# Patient Record
Sex: FEMALE | Race: ASIAN | NOT HISPANIC OR LATINO
[De-identification: names, ages, dates, MRNs, and addresses within clinical notes are randomized per-mention and may not be internally consistent; named-entity substitution may affect disease eponyms.]

---

## 2018-05-24 ENCOUNTER — APPOINTMENT (OUTPATIENT)
Dept: CARDIOLOGY | Facility: CLINIC | Age: 48
End: 2018-05-24
Payer: COMMERCIAL

## 2018-05-24 ENCOUNTER — NON-APPOINTMENT (OUTPATIENT)
Age: 48
End: 2018-05-24

## 2018-05-24 VITALS
WEIGHT: 145 LBS | DIASTOLIC BLOOD PRESSURE: 59 MMHG | RESPIRATION RATE: 16 BRPM | HEIGHT: 60 IN | HEART RATE: 69 BPM | BODY MASS INDEX: 28.47 KG/M2 | SYSTOLIC BLOOD PRESSURE: 95 MMHG | OXYGEN SATURATION: 100 %

## 2018-05-24 DIAGNOSIS — R07.89 OTHER CHEST PAIN: ICD-10-CM

## 2018-05-24 DIAGNOSIS — R94.31 ABNORMAL ELECTROCARDIOGRAM [ECG] [EKG]: ICD-10-CM

## 2018-05-24 PROCEDURE — 93000 ELECTROCARDIOGRAM COMPLETE: CPT

## 2018-05-24 PROCEDURE — 99204 OFFICE O/P NEW MOD 45 MIN: CPT

## 2018-06-26 ENCOUNTER — OUTPATIENT (OUTPATIENT)
Dept: OUTPATIENT SERVICES | Facility: HOSPITAL | Age: 48
LOS: 1 days | End: 2018-06-26

## 2018-06-26 ENCOUNTER — APPOINTMENT (OUTPATIENT)
Dept: CV DIAGNOSITCS | Facility: HOSPITAL | Age: 48
End: 2018-06-26
Payer: COMMERCIAL

## 2018-06-26 DIAGNOSIS — R94.31 ABNORMAL ELECTROCARDIOGRAM [ECG] [EKG]: ICD-10-CM

## 2018-06-26 PROCEDURE — 93306 TTE W/DOPPLER COMPLETE: CPT | Mod: 26

## 2018-07-18 ENCOUNTER — OUTPATIENT (OUTPATIENT)
Dept: OUTPATIENT SERVICES | Facility: HOSPITAL | Age: 48
LOS: 1 days | End: 2018-07-18
Payer: COMMERCIAL

## 2018-07-18 ENCOUNTER — APPOINTMENT (OUTPATIENT)
Dept: MAMMOGRAPHY | Facility: IMAGING CENTER | Age: 48
End: 2018-07-18
Payer: COMMERCIAL

## 2018-07-18 DIAGNOSIS — Z00.8 ENCOUNTER FOR OTHER GENERAL EXAMINATION: ICD-10-CM

## 2018-07-18 PROCEDURE — 77067 SCR MAMMO BI INCL CAD: CPT

## 2018-07-18 PROCEDURE — 77063 BREAST TOMOSYNTHESIS BI: CPT | Mod: 26

## 2018-07-18 PROCEDURE — 77063 BREAST TOMOSYNTHESIS BI: CPT

## 2018-07-18 PROCEDURE — 77067 SCR MAMMO BI INCL CAD: CPT | Mod: 26

## 2021-08-22 ENCOUNTER — INPATIENT (INPATIENT)
Facility: HOSPITAL | Age: 51
LOS: 2 days | Discharge: HOME | DRG: 446 | End: 2021-08-25
Attending: INTERNAL MEDICINE | Admitting: INTERNAL MEDICINE
Payer: COMMERCIAL

## 2021-08-22 VITALS
OXYGEN SATURATION: 97 % | SYSTOLIC BLOOD PRESSURE: 103 MMHG | WEIGHT: 151.9 LBS | TEMPERATURE: 98 F | RESPIRATION RATE: 18 BRPM | HEART RATE: 71 BPM | DIASTOLIC BLOOD PRESSURE: 73 MMHG

## 2021-08-22 DIAGNOSIS — Z29.9 ENCOUNTER FOR PROPHYLACTIC MEASURES, UNSPECIFIED: ICD-10-CM

## 2021-08-22 DIAGNOSIS — R10.9 UNSPECIFIED ABDOMINAL PAIN: ICD-10-CM

## 2021-08-22 DIAGNOSIS — R79.89 OTHER SPECIFIED ABNORMAL FINDINGS OF BLOOD CHEMISTRY: ICD-10-CM

## 2021-08-22 DIAGNOSIS — Z78.9 OTHER SPECIFIED HEALTH STATUS: Chronic | ICD-10-CM

## 2021-08-22 LAB
ALBUMIN SERPL ELPH-MCNC: 3.7 G/DL — SIGNIFICANT CHANGE UP (ref 3.3–5)
ALP SERPL-CCNC: 238 U/L — HIGH (ref 40–120)
ALT FLD-CCNC: 314 U/L — HIGH (ref 10–45)
ANION GAP SERPL CALC-SCNC: 12 MMOL/L — SIGNIFICANT CHANGE UP (ref 5–17)
ANISOCYTOSIS BLD QL: SLIGHT — SIGNIFICANT CHANGE UP
APPEARANCE UR: CLEAR — SIGNIFICANT CHANGE UP
APTT BLD: 34.4 SEC — SIGNIFICANT CHANGE UP (ref 27.5–35.5)
AST SERPL-CCNC: 273 U/L — HIGH (ref 10–40)
BACTERIA # UR AUTO: NEGATIVE — SIGNIFICANT CHANGE UP
BASOPHILS # BLD AUTO: 0.31 K/UL — HIGH (ref 0–0.2)
BASOPHILS NFR BLD AUTO: 3.5 % — HIGH (ref 0–2)
BILIRUB SERPL-MCNC: 2.4 MG/DL — HIGH (ref 0.2–1.2)
BILIRUB UR-MCNC: NEGATIVE — SIGNIFICANT CHANGE UP
BUN SERPL-MCNC: 7 MG/DL — SIGNIFICANT CHANGE UP (ref 7–23)
BURR CELLS BLD QL SMEAR: PRESENT — SIGNIFICANT CHANGE UP
CALCIUM SERPL-MCNC: 9.7 MG/DL — SIGNIFICANT CHANGE UP (ref 8.4–10.5)
CHLORIDE SERPL-SCNC: 103 MMOL/L — SIGNIFICANT CHANGE UP (ref 96–108)
CO2 SERPL-SCNC: 22 MMOL/L — SIGNIFICANT CHANGE UP (ref 22–31)
COLOR SPEC: YELLOW — SIGNIFICANT CHANGE UP
CREAT SERPL-MCNC: 0.99 MG/DL — SIGNIFICANT CHANGE UP (ref 0.5–1.3)
DACRYOCYTES BLD QL SMEAR: SLIGHT — SIGNIFICANT CHANGE UP
DIFF PNL FLD: NEGATIVE — SIGNIFICANT CHANGE UP
ELLIPTOCYTES BLD QL SMEAR: SLIGHT — SIGNIFICANT CHANGE UP
EOSINOPHIL # BLD AUTO: 0.15 K/UL — SIGNIFICANT CHANGE UP (ref 0–0.5)
EOSINOPHIL NFR BLD AUTO: 1.7 % — SIGNIFICANT CHANGE UP (ref 0–6)
EPI CELLS # UR: 0 /HPF — SIGNIFICANT CHANGE UP
GAS PNL BLDV: SIGNIFICANT CHANGE UP
GIANT PLATELETS BLD QL SMEAR: PRESENT — SIGNIFICANT CHANGE UP
GLUCOSE SERPL-MCNC: 85 MG/DL — SIGNIFICANT CHANGE UP (ref 70–99)
GLUCOSE UR QL: NEGATIVE — SIGNIFICANT CHANGE UP
HCG UR QL: NEGATIVE — SIGNIFICANT CHANGE UP
HCT VFR BLD CALC: 41.6 % — SIGNIFICANT CHANGE UP (ref 34.5–45)
HGB BLD-MCNC: 12.9 G/DL — SIGNIFICANT CHANGE UP (ref 11.5–15.5)
HYALINE CASTS # UR AUTO: 1 /LPF — SIGNIFICANT CHANGE UP (ref 0–2)
INR BLD: 1.11 RATIO — SIGNIFICANT CHANGE UP (ref 0.88–1.16)
KETONES UR-MCNC: SIGNIFICANT CHANGE UP
LEUKOCYTE ESTERASE UR-ACNC: NEGATIVE — SIGNIFICANT CHANGE UP
LIDOCAIN IGE QN: 44 U/L — SIGNIFICANT CHANGE UP (ref 7–60)
LYMPHOCYTES # BLD AUTO: 1.23 K/UL — SIGNIFICANT CHANGE UP (ref 1–3.3)
LYMPHOCYTES # BLD AUTO: 13.9 % — SIGNIFICANT CHANGE UP (ref 13–44)
MACROCYTES BLD QL: SLIGHT — SIGNIFICANT CHANGE UP
MANUAL SMEAR VERIFICATION: SIGNIFICANT CHANGE UP
MCHC RBC-ENTMCNC: 23.5 PG — LOW (ref 27–34)
MCHC RBC-ENTMCNC: 31 GM/DL — LOW (ref 32–36)
MCV RBC AUTO: 75.8 FL — LOW (ref 80–100)
MICROCYTES BLD QL: SIGNIFICANT CHANGE UP
MONOCYTES # BLD AUTO: 0.23 K/UL — SIGNIFICANT CHANGE UP (ref 0–0.9)
MONOCYTES NFR BLD AUTO: 2.6 % — SIGNIFICANT CHANGE UP (ref 2–14)
NEUTROPHILS # BLD AUTO: 6.93 K/UL — SIGNIFICANT CHANGE UP (ref 1.8–7.4)
NEUTROPHILS NFR BLD AUTO: 78.3 % — HIGH (ref 43–77)
NITRITE UR-MCNC: NEGATIVE — SIGNIFICANT CHANGE UP
NT-PROBNP SERPL-SCNC: 129 PG/ML — SIGNIFICANT CHANGE UP (ref 0–300)
OVALOCYTES BLD QL SMEAR: SLIGHT — SIGNIFICANT CHANGE UP
PH UR: 6 — SIGNIFICANT CHANGE UP (ref 5–8)
PLAT MORPH BLD: ABNORMAL
PLATELET # BLD AUTO: 230 K/UL — SIGNIFICANT CHANGE UP (ref 150–400)
POIKILOCYTOSIS BLD QL AUTO: SIGNIFICANT CHANGE UP
POLYCHROMASIA BLD QL SMEAR: SLIGHT — SIGNIFICANT CHANGE UP
POTASSIUM SERPL-MCNC: 4.7 MMOL/L — SIGNIFICANT CHANGE UP (ref 3.5–5.3)
POTASSIUM SERPL-SCNC: 4.7 MMOL/L — SIGNIFICANT CHANGE UP (ref 3.5–5.3)
PROT SERPL-MCNC: 7 G/DL — SIGNIFICANT CHANGE UP (ref 6–8.3)
PROT UR-MCNC: NEGATIVE — SIGNIFICANT CHANGE UP
PROTHROM AB SERPL-ACNC: 13.3 SEC — SIGNIFICANT CHANGE UP (ref 10.6–13.6)
RBC # BLD: 5.49 M/UL — HIGH (ref 3.8–5.2)
RBC # FLD: 15.5 % — HIGH (ref 10.3–14.5)
RBC BLD AUTO: ABNORMAL
RBC CASTS # UR COMP ASSIST: 1 /HPF — SIGNIFICANT CHANGE UP (ref 0–4)
SCHISTOCYTES BLD QL AUTO: SLIGHT — SIGNIFICANT CHANGE UP
SODIUM SERPL-SCNC: 137 MMOL/L — SIGNIFICANT CHANGE UP (ref 135–145)
SP GR SPEC: 1.01 — LOW (ref 1.01–1.02)
TROPONIN T, HIGH SENSITIVITY RESULT: <6 NG/L — SIGNIFICANT CHANGE UP (ref 0–51)
UROBILINOGEN FLD QL: NEGATIVE — SIGNIFICANT CHANGE UP
WBC # BLD: 8.85 K/UL — SIGNIFICANT CHANGE UP (ref 3.8–10.5)
WBC # FLD AUTO: 8.85 K/UL — SIGNIFICANT CHANGE UP (ref 3.8–10.5)
WBC UR QL: 1 /HPF — SIGNIFICANT CHANGE UP (ref 0–5)

## 2021-08-22 PROCEDURE — 76705 ECHO EXAM OF ABDOMEN: CPT | Mod: 26,RT

## 2021-08-22 PROCEDURE — 74177 CT ABD & PELVIS W/CONTRAST: CPT | Mod: 26,MA

## 2021-08-22 PROCEDURE — 99223 1ST HOSP IP/OBS HIGH 75: CPT

## 2021-08-22 PROCEDURE — 99285 EMERGENCY DEPT VISIT HI MDM: CPT

## 2021-08-22 PROCEDURE — 71046 X-RAY EXAM CHEST 2 VIEWS: CPT | Mod: 26

## 2021-08-22 RX ORDER — SODIUM CHLORIDE 9 MG/ML
1000 INJECTION, SOLUTION INTRAVENOUS
Refills: 0 | Status: DISCONTINUED | OUTPATIENT
Start: 2021-08-22 | End: 2021-08-25

## 2021-08-22 RX ORDER — ONDANSETRON 8 MG/1
4 TABLET, FILM COATED ORAL EVERY 8 HOURS
Refills: 0 | Status: DISCONTINUED | OUTPATIENT
Start: 2021-08-22 | End: 2021-08-25

## 2021-08-22 RX ORDER — SODIUM CHLORIDE 9 MG/ML
1000 INJECTION, SOLUTION INTRAVENOUS ONCE
Refills: 0 | Status: COMPLETED | OUTPATIENT
Start: 2021-08-22 | End: 2021-08-22

## 2021-08-22 RX ORDER — SIMETHICONE 80 MG/1
0 TABLET, CHEWABLE ORAL
Qty: 0 | Refills: 0 | DISCHARGE

## 2021-08-22 RX ORDER — OMEPRAZOLE 10 MG/1
1 CAPSULE, DELAYED RELEASE ORAL
Qty: 0 | Refills: 0 | DISCHARGE

## 2021-08-22 RX ORDER — FAMOTIDINE 10 MG/ML
20 INJECTION INTRAVENOUS ONCE
Refills: 0 | Status: COMPLETED | OUTPATIENT
Start: 2021-08-22 | End: 2021-08-22

## 2021-08-22 RX ADMIN — SODIUM CHLORIDE 2000 MILLILITER(S): 9 INJECTION, SOLUTION INTRAVENOUS at 16:03

## 2021-08-22 RX ADMIN — FAMOTIDINE 20 MILLIGRAM(S): 10 INJECTION INTRAVENOUS at 16:03

## 2021-08-22 RX ADMIN — SODIUM CHLORIDE 125 MILLILITER(S): 9 INJECTION, SOLUTION INTRAVENOUS at 18:55

## 2021-08-22 NOTE — ED PROVIDER NOTE - OBJECTIVE STATEMENT
50 yo F no pmh presents with 4 days of epigastric worsening pain. Pain is burning, worse 2-3 hrs after food intake, lasts for 2 hrs and accompanied with nausea and belching. Endorses having light colored stools, decreased PO intake. 52 yo F no pmh presents with 4 days of epigastric worsening pain. Pain is burning, worse 2-3 hrs after food intake, lasts for 2 hrs and accompanied with nausea and belching. Endorses having light colored stools, decreased PO intake. Denies vomiting, fevers, bloody bowel movements, dysuria, urinary frequency.

## 2021-08-22 NOTE — H&P ADULT - PROBLEM SELECTOR PLAN 1
epigastric abdominal pain w/ radiation to back. LFTs w/ cholestatic pattern and CT w/ calcification measuring 0.3 cm in the region of the ampulla of Vater and associated dilated CBD and minimal intrahepatic biliary ductal dilation. Concern for possible intraductal stone.   - GI/hepatology consult in am  - currently pain controlled; if needed, can use morphine 2 mg IV for severe pain  - zofran prn for nausea

## 2021-08-22 NOTE — H&P ADULT - NSHPLABSRESULTS_GEN_ALL_CORE
Personally reviewed labs, imaging and EKG.             12.9   8.85  )-----------( 230      ( 22 Aug 2021 15:58 )             41.6     137  |  103  |  7   ----------------------------<  85  4.7   |  22  |  0.99    Ca    9.7      22 Aug 2021 15:58    TPro  7.0  /  Alb  3.7  /  TBili  2.4<H>  /  DBili  1.3<H>  /  AST  273<H>  /  ALT  314<H>  /  AlkPhos  238<H>      LIVER FUNCTIONS - ( 22 Aug 2021 15:58 )  Alb: 3.7 g/dL / Pro: 7.0 g/dL / ALK PHOS: 238 U/L / ALT: 314 U/L / AST: 273 U/L / GGT: x           PT/INR - ( 22 Aug 2021 17:20 )   PT: 13.3 sec;   INR: 1.11 ratio    PTT - ( 22 Aug 2021 17:20 )  PTT:34.4 sec    Urinalysis Basic - ( 22 Aug 2021 16:48 )  Color: Yellow / Appearance: Clear / S.007 / pH: x  Gluc: x / Ketone: Trace  / Bili: Negative / Urobili: Negative   Blood: x / Protein: Negative / Nitrite: Negative   Leuk Esterase: Negative / RBC: 1 /hpf / WBC 1 /HPF   Sq Epi: x / Non Sq Epi: 0 /hpf / Bacteria: Negative    EKG w/ normal sinus rhythm, no acute ST changes.  CXR w/o focal opacities.     RUQ US: Liver: Hepatic lobe measures 13.2 cm. Echogenic liver lesion measuring 1.2 x 0.7 x 0.9 cm, likely representing hemangioma.  Bile ducts: Borderline common duct, 7 mm. Mild intrahepatic biliary duct dilatation  Gallbladder: No gallstones    CT Abdomen/Pelvis:   Liver-  A 1 cm hypodense right hepatic nodule is too small to characterize, and possibly corresponds to the echogenic nodule on ultrasound. An additional smaller left hepatic hypodensity is similarly too small to characterize.  BILE DUCTS- Minimal intrahepatic biliary ductal dilation. Common bile duct is dilated up to 8 mm. There is a calcification measuring 0.3 cm in the region of the ampulla of Vater.  GALLBLADDER- Mildly distended gallbladder. No radiopaque gallstones.

## 2021-08-22 NOTE — H&P ADULT - ASSESSMENT
51 yr old female presents w/ abdominal pain. Noted to have elevated LFTs w/ cholestatic pattern. CT w/ dilated CBD and minimal intrahepatic biliary ductal dilation.

## 2021-08-22 NOTE — ED ADULT NURSE REASSESSMENT NOTE - NS ED NURSE REASSESS COMMENT FT1
admitting team at bedside for eval. Pt is resting comfortably in stretcher, VSS, awaiting admission bed.

## 2021-08-22 NOTE — H&P ADULT - NSHPPHYSICALEXAM_GEN_ALL_CORE
PHYSICAL EXAM:   GENERAL: Alert. Not confused. No acute distress. Not thin. Not cachectic. Not obese.  HEAD:  Atraumatic. Normocephalic.  EYES: EOMI. PERRLA. Normal conjunctiva/sclera.  ENT: Neck supple. No JVD. Moist oral mucosa. Not edentulous. No thrush.  LYMPH: Normal supraclavicular/cervical lymph nodes.   CARDIAC: No tachy, Not tina. Regular rhythm. Not irregularly irregular. S1. S2. No murmur. No rub. No distant heart sounds.  LUNG/CHEST: CTAB. BS equal bilaterally. No wheezes. No rales. No rhonchi.  ABDOMEN: Soft. +tender to palpation in epigastric region. No distension. No fluid wave. Normal bowel sounds.  BACK: No midline/vertebral tenderness. No flank tenderness.  VASCULAR: +2 b/l radial pulses. Palpable DP pulses.  EXTREMITIES:  No clubbing. No cyanosis. No edema. Moving all 4.  NEUROLOGY: A&Ox3. Non-focal exam. Cranial nerves intact. Normal speech. Sensation intact.  PSYCH: Normal behavior. Normal affect.  SKIN: No jaundice. No erythema. No rash/lesion.  Vascular Access:     ICU Vital Signs Last 24 Hrs  T(C): 36.9 (22 Aug 2021 20:40), Max: 36.9 (22 Aug 2021 16:30)  T(F): 98.5 (22 Aug 2021 20:40), Max: 98.5 (22 Aug 2021 16:30)  HR: 68 (22 Aug 2021 20:40) (68 - 71)  BP: 102/68 (22 Aug 2021 20:40) (102/68 - 105/70)  BP(mean): --  ABP: --  ABP(mean): --  RR: 18 (22 Aug 2021 20:40) (18 - 18)  SpO2: 98% (22 Aug 2021 20:40) (97% - 99%)      I&O's Summary

## 2021-08-22 NOTE — H&P ADULT - HISTORY OF PRESENT ILLNESS
51 yr old female w/ no significant PMH presents w/ abdominal pain. Patient notes that about one week ago she started experiencing epigastric pain that would start about 1-2 hours after eating. It felt like the "choking" and "tightening" sensation in her epigastric region and radiated to her back. She notes she also has a feeling like something is "stuck" in that region. After about 2 hours typically after eating or drinking something, the pain starts and is associated w/ nausea, burping and acid reflux sensation. At rest she is okay but feels like the pain worsens w/ walking. Also feels short of breath after these episodes. Endorses pale colored stools. No diarrhea/constipation. No unintentional weight loss.   She was seen by her PCP and started omeprazole. She endorses taking two doses w/o significant relief. Today, she had milk and a banana this morning but an hour later had an onset of pain so she decided to come to the ED.

## 2021-08-22 NOTE — ED PROVIDER NOTE - NS ED ROS FT
GENERAL: No fever, chills  EYES: no vision changes, no discharge.   ENT: no difficulty swallowing or speaking   CARDIAC: no chest pain/pressure, SOB, lower extremity swelling  PULMONARY: no cough, SOB  GI: +abdominal pain, +nausea, no v/d  : no dysuria, no hematuria  SKIN: no rashes, no ecchymosis  NEURO: no headache, lightheadedness  MSK: No joint pain, myalgia, weakness.

## 2021-08-22 NOTE — ED PROVIDER NOTE - CLINICAL SUMMARY MEDICAL DECISION MAKING FREE TEXT BOX
50 yo F no pmh c/o 4 day hx of epigastric intermitted abd pain that is worse after PO intake. Pt also complaining of pale colored stools. PE VSS epigastric tenderness. Ddx cholecystis, cholelithiasis, gastritis, PUD, pancreatitis. CBC, CMP, lipase, UPT, trops, CT abd/pelvis 50 yo F no pmh c/o 4 day hx of epigastric intermitted abd pain that is worse after PO intake. Pt also complaining of pale colored stools. PE VSS epigastric tenderness. Ddx cholecystis, cholelithiasis, gastritis, PUD, pancreatitis. CBC, CMP, lipase, UPT, trops, CT abd/pelvis, pepcid

## 2021-08-22 NOTE — H&P ADULT - NSICDXFAMILYHX_GEN_ALL_CORE_FT
FAMILY HISTORY:  Mother  Still living? Unknown  FH: esophageal cancer, Age at diagnosis: Age Unknown

## 2021-08-22 NOTE — H&P ADULT - NSHPREVIEWOFSYSTEMS_GEN_ALL_CORE
REVIEW OF SYSTEMS:  CONSTITUTIONAL: No weakness. No fevers. No chills. No rigors. No weight loss. No night sweats. No poor appetite.  EYES: No blurry or double vision. No eye pain.  ENT: No hearing difficulty. No vertigo. No dysphagia. No sore throat. No Sinusitis/rhinorrhea.   NECK: No pain. No stiffness/rigidity.  CARDIAC: No chest pain. No palpitations. No lightheadedness. No syncope.  RESPIRATORY: No cough. No SOB. No hemoptysis.  GASTROINTESTINAL: See HPI. No diarrhea. No constipation. No melena. No hematochezia.  GENITOURINARY: No dysuria. No frequency. No hesitancy. No hematuria. No oliguria.  NEUROLOGICAL: No numbness/tingling. No focal weakness. No urinary or fecal incontinence. No headache. No unsteady gait.  BACK: No back pain. No flank pain.  EXTREMITIES: No lower extremity edema. Full ROM. No joint pain.  SKIN: No rashes. No itching. No other lesions.  PSYCHIATRIC: No depression. No anxiety. No SI/HI.  ALLERGIC: No lip swelling. No hives.  All other review of systems is negative unless indicated above.  Unless indicated above, unable to assess ROS 2/2

## 2021-08-22 NOTE — H&P ADULT - PROBLEM SELECTOR PLAN 2
- w/ elevated bilirubin; likely secondary to stone, as above  - will also obtain hepatitis panel, hiv, cmv, ebv  - F/up CMP, T bili, PT/INR

## 2021-08-22 NOTE — ED PROVIDER NOTE - ATTENDING CONTRIBUTION TO CARE
------------ATTENDING NOTE------------   pt w/  c/o several days of epigastric moderate discomfort, worse w/ eating/drinking, associated nausea, slight jaundice on exam, tender epigastrium w/o guarding/rebound, ED Sign Out pending labs/imaging and close reassessments for tx / dispo decision.  - Willie Berman MD   ----------------------------------------------

## 2021-08-23 LAB
ALBUMIN SERPL ELPH-MCNC: 3.6 G/DL — SIGNIFICANT CHANGE UP (ref 3.3–5)
ALP SERPL-CCNC: 205 U/L — HIGH (ref 40–120)
ALT FLD-CCNC: 237 U/L — HIGH (ref 10–45)
ANION GAP SERPL CALC-SCNC: 11 MMOL/L — SIGNIFICANT CHANGE UP (ref 5–17)
APTT BLD: 32.6 SEC — SIGNIFICANT CHANGE UP (ref 27.5–35.5)
AST SERPL-CCNC: 134 U/L — HIGH (ref 10–40)
BASOPHILS # BLD AUTO: 0.05 K/UL — SIGNIFICANT CHANGE UP (ref 0–0.2)
BASOPHILS NFR BLD AUTO: 0.9 % — SIGNIFICANT CHANGE UP (ref 0–2)
BILIRUB DIRECT SERPL-MCNC: 0.6 MG/DL — HIGH (ref 0–0.2)
BILIRUB INDIRECT FLD-MCNC: 0.8 MG/DL — SIGNIFICANT CHANGE UP (ref 0.2–1)
BILIRUB SERPL-MCNC: 1.4 MG/DL — HIGH (ref 0.2–1.2)
BILIRUB SERPL-MCNC: 1.4 MG/DL — HIGH (ref 0.2–1.2)
BUN SERPL-MCNC: 6 MG/DL — LOW (ref 7–23)
CALCIUM SERPL-MCNC: 9.4 MG/DL — SIGNIFICANT CHANGE UP (ref 8.4–10.5)
CHLORIDE SERPL-SCNC: 104 MMOL/L — SIGNIFICANT CHANGE UP (ref 96–108)
CO2 SERPL-SCNC: 25 MMOL/L — SIGNIFICANT CHANGE UP (ref 22–31)
COVID-19 SPIKE DOMAIN AB INTERP: POSITIVE
COVID-19 SPIKE DOMAIN ANTIBODY RESULT: >250 U/ML — HIGH
CREAT SERPL-MCNC: 1.06 MG/DL — SIGNIFICANT CHANGE UP (ref 0.5–1.3)
EOSINOPHIL # BLD AUTO: 0.39 K/UL — SIGNIFICANT CHANGE UP (ref 0–0.5)
EOSINOPHIL NFR BLD AUTO: 7 % — HIGH (ref 0–6)
GLUCOSE SERPL-MCNC: 87 MG/DL — SIGNIFICANT CHANGE UP (ref 70–99)
HCT VFR BLD CALC: 39.7 % — SIGNIFICANT CHANGE UP (ref 34.5–45)
HGB BLD-MCNC: 12.2 G/DL — SIGNIFICANT CHANGE UP (ref 11.5–15.5)
IMM GRANULOCYTES NFR BLD AUTO: 0.4 % — SIGNIFICANT CHANGE UP (ref 0–1.5)
INR BLD: 1.11 RATIO — SIGNIFICANT CHANGE UP (ref 0.88–1.16)
LACTATE SERPL-SCNC: 0.9 MMOL/L — SIGNIFICANT CHANGE UP (ref 0.7–2)
LYMPHOCYTES # BLD AUTO: 1.32 K/UL — SIGNIFICANT CHANGE UP (ref 1–3.3)
LYMPHOCYTES # BLD AUTO: 23.6 % — SIGNIFICANT CHANGE UP (ref 13–44)
MCHC RBC-ENTMCNC: 23.2 PG — LOW (ref 27–34)
MCHC RBC-ENTMCNC: 30.7 GM/DL — LOW (ref 32–36)
MCV RBC AUTO: 75.5 FL — LOW (ref 80–100)
MONOCYTES # BLD AUTO: 0.53 K/UL — SIGNIFICANT CHANGE UP (ref 0–0.9)
MONOCYTES NFR BLD AUTO: 9.5 % — SIGNIFICANT CHANGE UP (ref 2–14)
NEUTROPHILS # BLD AUTO: 3.28 K/UL — SIGNIFICANT CHANGE UP (ref 1.8–7.4)
NEUTROPHILS NFR BLD AUTO: 58.6 % — SIGNIFICANT CHANGE UP (ref 43–77)
NRBC # BLD: 0 /100 WBCS — SIGNIFICANT CHANGE UP (ref 0–0)
PLATELET # BLD AUTO: 267 K/UL — SIGNIFICANT CHANGE UP (ref 150–400)
POTASSIUM SERPL-MCNC: 3.9 MMOL/L — SIGNIFICANT CHANGE UP (ref 3.5–5.3)
POTASSIUM SERPL-SCNC: 3.9 MMOL/L — SIGNIFICANT CHANGE UP (ref 3.5–5.3)
PROT SERPL-MCNC: 6.7 G/DL — SIGNIFICANT CHANGE UP (ref 6–8.3)
PROTHROM AB SERPL-ACNC: 13.3 SEC — SIGNIFICANT CHANGE UP (ref 10.6–13.6)
RBC # BLD: 5.26 M/UL — HIGH (ref 3.8–5.2)
RBC # FLD: 15.6 % — HIGH (ref 10.3–14.5)
SARS-COV-2 IGG+IGM SERPL QL IA: >250 U/ML — HIGH
SARS-COV-2 IGG+IGM SERPL QL IA: POSITIVE
SARS-COV-2 RNA SPEC QL NAA+PROBE: SIGNIFICANT CHANGE UP
SODIUM SERPL-SCNC: 140 MMOL/L — SIGNIFICANT CHANGE UP (ref 135–145)
WBC # BLD: 5.59 K/UL — SIGNIFICANT CHANGE UP (ref 3.8–10.5)
WBC # FLD AUTO: 5.59 K/UL — SIGNIFICANT CHANGE UP (ref 3.8–10.5)

## 2021-08-23 PROCEDURE — 99253 IP/OBS CNSLTJ NEW/EST LOW 45: CPT | Mod: GC

## 2021-08-23 PROCEDURE — 74183 MRI ABD W/O CNTR FLWD CNTR: CPT | Mod: 26

## 2021-08-23 RX ADMIN — SODIUM CHLORIDE 125 MILLILITER(S): 9 INJECTION, SOLUTION INTRAVENOUS at 14:29

## 2021-08-23 NOTE — CONSULT NOTE ADULT - ASSESSMENT
51F p/w 2-3 days of post-prandial abd pain, found to have elevated liver enzymes and imaging showing stone at ampulla.    Impression:  #Choledocholithiasis w/o cholangitis    Recommendations:  - Plan for ERCP. Later today vs tomorrow, timing pending scheduling.  - Keep NPO for now.  - Trend liver enzymes, WBC.  - Eventually will need cholecystectomy.  - GI will follow and update team and patient re: timing of procedure.    Bronson Nieves  Gastroenterology/Hepatology Fellow  Available via [employer] Teams    NON-URGENT CONSULTS:  Please email [email or web address]  AT NIGHT AND ON WEEKENDS:  Contact on-call GI fellow via answering service ([phone]) from 5pm-8am and on weekends/holidays  MONDAY-FRIDAY 8AM-5PM:  Pager# [phone] ([facility]) or [phone] ([facility])  GI Phone# [phone] ([facility])

## 2021-08-23 NOTE — CONSULT NOTE ADULT - ATTENDING COMMENTS
51F p/w 2-3 days of post-prandial abd pain, found to have elevated liver enzymes and imaging showing stone at ampulla.  Will plan for ERCP for stone removal tomorrow.  NPO after midnight. Check CMP, INR, CBC.    Thank you for this interesting consult.  Please call the advanced GI service with any questions or concerns.

## 2021-08-24 PROCEDURE — 43264 ERCP REMOVE DUCT CALCULI: CPT | Mod: GC

## 2021-08-24 PROCEDURE — 43262 ENDO CHOLANGIOPANCREATOGRAPH: CPT | Mod: 59,GC

## 2021-08-24 RX ORDER — INDOMETHACIN 50 MG
100 CAPSULE ORAL ONCE
Refills: 0 | Status: DISCONTINUED | OUTPATIENT
Start: 2021-08-24 | End: 2021-08-25

## 2021-08-24 RX ADMIN — SODIUM CHLORIDE 125 MILLILITER(S): 9 INJECTION, SOLUTION INTRAVENOUS at 22:50

## 2021-08-24 NOTE — PRE PROCEDURE NOTE - PRE PROCEDURE EVALUATION
Pre-Endoscopy Evaluation    Attending Physician: Nickolas    Procedure: ERCP    Indication for Procedure: Stone    Pertinent History: See [employer] chart    PAST MEDICAL & SURGICAL HISTORY:  No pertinent past medical history    No pertinent past surgical history        Allergies    No Known Allergies    Intolerances        Medications: MEDICATIONS  (STANDING):  lactated ringers. 1000 milliLiter(s) (125 mL/Hr) IV Continuous <Continuous>    MEDICATIONS  (PRN):  ondansetron Injectable 4 milliGRAM(s) IV Push every 8 hours PRN Nausea and/or Vomiting      Pertinent lab data:                        12.2   5.59  )-----------( 267      ( 23 Aug 2021 05:55 )             39.7     08-23    140  |  104  |  6<L>  ----------------------------<  87  3.9   |  25  |  1.06    Ca    9.4      23 Aug 2021 05:55    TPro  6.7  /  Alb  3.6  /  TBili  1.4<H>  /  DBili  0.6<H>  /  AST  134<H>  /  ALT  237<H>  /  AlkPhos  205<H>  08-23    PT/INR - ( 23 Aug 2021 05:55 )   PT: 13.3 sec;   INR: 1.11 ratio         PTT - ( 23 Aug 2021 05:55 )  PTT:32.6 sec            Physical Examination:  Daily Height in cm: 152.4 (24 Aug 2021 08:12)    Daily   Vital Signs Last 24 Hrs  T(C): 36.3 (24 Aug 2021 08:34), Max: 36.7 (23 Aug 2021 17:45)  T(F): 97.3 (24 Aug 2021 08:34), Max: 98.1 (23 Aug 2021 17:45)  HR: 63 (24 Aug 2021 08:34) (60 - 79)  BP: 94/40 (24 Aug 2021 08:34) (94/40 - 113/68)  BP(mean): --  RR: 16 (24 Aug 2021 08:34) (16 - 18)  SpO2: 100% (24 Aug 2021 08:34) (97% - 100%)  Constitutional: NAD  HEENT: PERRLA, EOMI,    Neck:  No JVD  Respiratory: CTAB/L  Cardiovascular: S1 and S2  Gastrointestinal: BS+, soft, NT/ND  Extremities: No peripheral edema  Neurological: A/O x 3, no focal deficits  Psychiatric: Normal mood, normal affect  : No [place]  Skin: No rashes    Comments:    ASA Class: I []  II []  III []  IV []    The patient is a suitable candidate for the planned procedure unless box checked [ ]  No, explain:

## 2021-08-24 NOTE — PRE-ANESTHESIA EVALUATION ADULT - NSANTHOSAYNRD_GEN_A_CORE
No. TOMÁS screening performed.  STOP BANG Legend: 0-2 = LOW Risk; 3-4 = INTERMEDIATE Risk; 5-8 = HIGH Risk

## 2021-08-25 ENCOUNTER — TRANSCRIPTION ENCOUNTER (OUTPATIENT)
Age: 51
End: 2021-08-25

## 2021-08-25 VITALS
DIASTOLIC BLOOD PRESSURE: 68 MMHG | HEART RATE: 66 BPM | SYSTOLIC BLOOD PRESSURE: 105 MMHG | TEMPERATURE: 98 F | RESPIRATION RATE: 18 BRPM | OXYGEN SATURATION: 99 %

## 2021-08-25 LAB
ALBUMIN SERPL ELPH-MCNC: 3.1 G/DL — LOW (ref 3.3–5)
ALP SERPL-CCNC: 149 U/L — HIGH (ref 40–120)
ALT FLD-CCNC: 118 U/L — HIGH (ref 10–45)
ANION GAP SERPL CALC-SCNC: 11 MMOL/L — SIGNIFICANT CHANGE UP (ref 5–17)
AST SERPL-CCNC: 56 U/L — HIGH (ref 10–40)
BILIRUB SERPL-MCNC: 0.8 MG/DL — SIGNIFICANT CHANGE UP (ref 0.2–1.2)
BUN SERPL-MCNC: 7 MG/DL — SIGNIFICANT CHANGE UP (ref 7–23)
CALCIUM SERPL-MCNC: 8.7 MG/DL — SIGNIFICANT CHANGE UP (ref 8.4–10.5)
CHLORIDE SERPL-SCNC: 106 MMOL/L — SIGNIFICANT CHANGE UP (ref 96–108)
CO2 SERPL-SCNC: 23 MMOL/L — SIGNIFICANT CHANGE UP (ref 22–31)
CREAT SERPL-MCNC: 0.87 MG/DL — SIGNIFICANT CHANGE UP (ref 0.5–1.3)
GLUCOSE SERPL-MCNC: 104 MG/DL — HIGH (ref 70–99)
POTASSIUM SERPL-MCNC: 4 MMOL/L — SIGNIFICANT CHANGE UP (ref 3.5–5.3)
POTASSIUM SERPL-SCNC: 4 MMOL/L — SIGNIFICANT CHANGE UP (ref 3.5–5.3)
PROT SERPL-MCNC: 5.9 G/DL — LOW (ref 6–8.3)
SODIUM SERPL-SCNC: 140 MMOL/L — SIGNIFICANT CHANGE UP (ref 135–145)

## 2021-08-25 PROCEDURE — 71046 X-RAY EXAM CHEST 2 VIEWS: CPT

## 2021-08-25 PROCEDURE — C1769: CPT

## 2021-08-25 PROCEDURE — 99232 SBSQ HOSP IP/OBS MODERATE 35: CPT | Mod: GC

## 2021-08-25 PROCEDURE — 82947 ASSAY GLUCOSE BLOOD QUANT: CPT

## 2021-08-25 PROCEDURE — 84484 ASSAY OF TROPONIN QUANT: CPT

## 2021-08-25 PROCEDURE — 85025 COMPLETE CBC W/AUTO DIFF WBC: CPT

## 2021-08-25 PROCEDURE — 85014 HEMATOCRIT: CPT

## 2021-08-25 PROCEDURE — 76705 ECHO EXAM OF ABDOMEN: CPT

## 2021-08-25 PROCEDURE — 84132 ASSAY OF SERUM POTASSIUM: CPT

## 2021-08-25 PROCEDURE — 82248 BILIRUBIN DIRECT: CPT

## 2021-08-25 PROCEDURE — 74183 MRI ABD W/O CNTR FLWD CNTR: CPT

## 2021-08-25 PROCEDURE — 82435 ASSAY OF BLOOD CHLORIDE: CPT

## 2021-08-25 PROCEDURE — 82247 BILIRUBIN TOTAL: CPT

## 2021-08-25 PROCEDURE — 83605 ASSAY OF LACTIC ACID: CPT

## 2021-08-25 PROCEDURE — 96374 THER/PROPH/DIAG INJ IV PUSH: CPT

## 2021-08-25 PROCEDURE — A9585: CPT

## 2021-08-25 PROCEDURE — 82330 ASSAY OF CALCIUM: CPT

## 2021-08-25 PROCEDURE — 84295 ASSAY OF SERUM SODIUM: CPT

## 2021-08-25 PROCEDURE — 86769 SARS-COV-2 COVID-19 ANTIBODY: CPT

## 2021-08-25 PROCEDURE — 83880 ASSAY OF NATRIURETIC PEPTIDE: CPT

## 2021-08-25 PROCEDURE — 83690 ASSAY OF LIPASE: CPT

## 2021-08-25 PROCEDURE — 81025 URINE PREGNANCY TEST: CPT

## 2021-08-25 PROCEDURE — 74177 CT ABD & PELVIS W/CONTRAST: CPT | Mod: MA

## 2021-08-25 PROCEDURE — 85018 HEMOGLOBIN: CPT

## 2021-08-25 PROCEDURE — U0003: CPT

## 2021-08-25 PROCEDURE — 74330 X-RAY BILE/PANC ENDOSCOPY: CPT

## 2021-08-25 PROCEDURE — 85730 THROMBOPLASTIN TIME PARTIAL: CPT

## 2021-08-25 PROCEDURE — U0005: CPT

## 2021-08-25 PROCEDURE — 82803 BLOOD GASES ANY COMBINATION: CPT

## 2021-08-25 PROCEDURE — 85610 PROTHROMBIN TIME: CPT

## 2021-08-25 PROCEDURE — 81001 URINALYSIS AUTO W/SCOPE: CPT

## 2021-08-25 PROCEDURE — 99285 EMERGENCY DEPT VISIT HI MDM: CPT | Mod: 25

## 2021-08-25 PROCEDURE — 80053 COMPREHEN METABOLIC PANEL: CPT

## 2021-08-25 RX ORDER — IBUPROFEN 200 MG
200 TABLET ORAL ONCE
Refills: 0 | Status: COMPLETED | OUTPATIENT
Start: 2021-08-25 | End: 2021-08-25

## 2021-08-25 RX ORDER — CALCIUM CARBONATE 500(1250)
2 TABLET ORAL
Qty: 0 | Refills: 0 | DISCHARGE

## 2021-08-25 RX ORDER — INDOMETHACIN 50 MG
4 CAPSULE ORAL
Qty: 0 | Refills: 0 | DISCHARGE
Start: 2021-08-25

## 2021-08-25 RX ADMIN — Medication 200 MILLIGRAM(S): at 01:57

## 2021-08-25 RX ADMIN — Medication 200 MILLIGRAM(S): at 02:28

## 2021-08-25 NOTE — PROGRESS NOTE ADULT - SUBJECTIVE AND OBJECTIVE BOX
Patient is a 51y old  Female who presents with a chief complaint of abdominal pain (23 Aug 2021 12:54)    2021  HPI:  ERCP succesful      PAST MEDICAL & SURGICAL HISTORY:  No pertinent past medical history    No pertinent past surgical history        Review of Systems:   CONSTITUTIONAL: No fever, weight loss, or fatigue  EYES: No eye pain, visual disturbances, or discharge  ENMT:  No difficulty hearing, tinnitus, vertigo; No sinus or throat pain  NECK: No pain or stiffness  BREASTS: No pain, masses, or nipple discharge  RESPIRATORY: No cough, wheezing, chills or hemoptysis; No shortness of breath  CARDIOVASCULAR: No chest pain, palpitations, dizziness, or leg swelling  GASTROINTESTINAL: No abdominal or epigastric pain. No nausea, vomiting, or hematemesis; No diarrhea or constipation. No melena or hematochezia.  GENITOURINARY: No dysuria, frequency, hematuria, or incontinence  NEUROLOGICAL: No headaches, memory loss, loss of strength, numbness, or tremors  SKIN: No itching, burning, rashes, or lesions   LYMPH NODES: No enlarged glands  ENDOCRINE: No heat or cold intolerance; No hair loss  MUSCULOSKELETAL: No joint pain or swelling; No muscle, back, or extremity pain  PSYCHIATRIC: No depression, anxiety, mood swings, or difficulty sleeping  HEME/LYMPH: No easy bruising, or bleeding gums  ALLERY AND IMMUNOLOGIC: No hives or eczema    Allergies    No Known Allergies    Intolerances        Social History:     FAMILY HISTORY:  FH: esophageal cancer (Mother)        MEDICATIONS  (STANDING):  indomethacin 100 milliGRAM(s) Oral once  lactated ringers. 1000 milliLiter(s) (125 mL/Hr) IV Continuous <Continuous>    MEDICATIONS  (PRN):  ondansetron Injectable 4 milliGRAM(s) IV Push every 8 hours PRN Nausea and/or Vomiting        CAPILLARY BLOOD GLUCOSE        I&O's Summary      PHYSICAL EXAM:  Vital Signs Last 24 Hrs  T(C): 36.3 (24 Aug 2021 08:34), Max: 36.7 (23 Aug 2021 17:45)  T(F): 97.3 (24 Aug 2021 08:34), Max: 98.1 (23 Aug 2021 17:45)  HR: 63 (24 Aug 2021 08:34) (60 - 79)  BP: 94/40 (24 Aug 2021 08:34) (94/40 - 113/68)  BP(mean): --  RR: 16 (24 Aug 2021 08:34) (16 - 18)  SpO2: 100% (24 Aug 2021 08:34) (97% - 100%)    GENERAL: NAD, well-developed  HEAD:  Atraumatic, Normocephalic  EYES: EOMI, PERRLA, conjunctiva and sclera clear  NECK: Supple, No JVD  CHEST/LUNG: Clear to auscultation bilaterally; No wheeze  HEART: Regular rate and rhythm; No murmurs, rubs, or gallops  ABDOMEN: Soft, Nontender, Nondistended; Bowel sounds present  EXTREMITIES:  2+ Peripheral Pulses, No clubbing, cyanosis, or edema  PSYCH: AAOx3  NEUROLOGY: non-focal  SKIN: No rashes or lesions    LABS:                        12.2   5.59  )-----------( 267      ( 23 Aug 2021 05:55 )             39.7     08    140  |  104  |  6<L>  ----------------------------<  87  3.9   |  25  |  1.06    Ca    9.4      23 Aug 2021 05:55    TPro  6.7  /  Alb  3.6  /  TBili  1.4<H>  /  DBili  0.6<H>  /  AST  134<H>  /  ALT  237<H>  /  AlkPhos  205<H>  08-23    PT/INR - ( 23 Aug 2021 05:55 )   PT: 13.3 sec;   INR: 1.11 ratio         PTT - ( 23 Aug 2021 05:55 )  PTT:32.6 sec      Urinalysis Basic - ( 22 Aug 2021 16:48 )    Color: Yellow / Appearance: Clear / S.007 / pH: x  Gluc: x / Ketone: Trace  / Bili: Negative / Urobili: Negative   Blood: x / Protein: Negative / Nitrite: Negative   Leuk Esterase: Negative / RBC: 1 /hpf / WBC 1 /HPF   Sq Epi: x / Non Sq Epi: 0 /hpf / Bacteria: Negative        RADIOLOGY & ADDITIONAL TESTS:    Imaging Personally Reviewed:    Consultant(s) Notes Reviewed:      Care Discussed with Consultants/Other Providers:  
Chief Complaint:  Patient is a 51y old  Female who presents with a chief complaint of abdominal pain (24 Aug 2021 22:10)    Reason for consult: choledocholithiasis     Interval Events: s/p ERCP. Reports some sub-chondral pain w/ movement, tolerating liquids well.     Hospital Medications:  indomethacin 100 milliGRAM(s) Oral once  lactated ringers. 1000 milliLiter(s) IV Continuous <Continuous>  ondansetron Injectable 4 milliGRAM(s) IV Push every 8 hours PRN      ROS:   General:  No  fevers, chills, night sweats, fatigue  Eyes:  Good vision, no reported pain  ENT:  No sore throat, pain, runny nose  CV:  No pain, palpitations  Pulm:  No dyspnea, cough  GI:  See HPI, otherwise negative  :  No  incontinence, nocturia  Muscle:  No pain, weakness  Neuro:  No memory problems  Psych:  No insomnia, mood problems, depression  Endocrine:  No polyuria, polydipsia, cold/heat intolerance  Heme:  No petechiae, ecchymosis, easy bruisability  Skin:  No rash    PHYSICAL EXAM:   Vital Signs:  Vital Signs Last 24 Hrs  T(C): 36.6 (25 Aug 2021 08:24), Max: 36.7 (24 Aug 2021 15:30)  T(F): 97.9 (25 Aug 2021 08:24), Max: 98 (24 Aug 2021 15:30)  HR: 83 (25 Aug 2021 08:24) (60 - 83)  BP: 97/58 (25 Aug 2021 08:24) (96/45 - 109/50)  BP(mean): --  RR: 18 (25 Aug 2021 08:24) (16 - 20)  SpO2: 98% (25 Aug 2021 08:24) (98% - 100%)  Daily     Daily     GENERAL: no acute distress  NEURO: alert  HEENT: anicteric sclera, no conjunctival pallor appreciated  CHEST: no respiratory distress, no accessory muscle use  CARDIAC: regular rate, rhythm  ABDOMEN: soft, non-tender, non-distended, no rebound or guarding  EXTREMITIES: warm, well perfused, no edema  SKIN: no lesions noted    LABS: reviewed    08-25    140  |  106  |  7   ----------------------------<  104<H>  4.0   |  23  |  0.87    Ca    8.7      25 Aug 2021 07:19    TPro  5.9<L>  /  Alb  3.1<L>  /  TBili  0.8  /  DBili  x   /  AST  56<H>  /  ALT  118<H>  /  AlkPhos  149<H>  08-25    LIVER FUNCTIONS - ( 25 Aug 2021 07:19 )  Alb: 3.1 g/dL / Pro: 5.9 g/dL / ALK PHOS: 149 U/L / ALT: 118 U/L / AST: 56 U/L / GGT: x             Interval Diagnostic Studies: see sunrise for full report  
Patient is a 51y old  Female who presents with a chief complaint of abdominal pain (23 Aug 2021 12:54)      HPI:  Abdominal pain has improved. No nausea or vomitting    PAST MEDICAL & SURGICAL HISTORY:  No pertinent past medical history    No pertinent past surgical history        Review of Systems:   CONSTITUTIONAL: No fever, weight loss, or fatigue  EYES: No eye pain, visual disturbances, or discharge  ENMT:  No difficulty hearing, tinnitus, vertigo; No sinus or throat pain  NECK: No pain or stiffness  BREASTS: No pain, masses, or nipple discharge  RESPIRATORY: No cough, wheezing, chills or hemoptysis; No shortness of breath  CARDIOVASCULAR: No chest pain, palpitations, dizziness, or leg swelling  GASTROINTESTINAL: No abdominal or epigastric pain. No nausea, vomiting, or hematemesis; No diarrhea or constipation. No melena or hematochezia.  GENITOURINARY: No dysuria, frequency, hematuria, or incontinence  NEUROLOGICAL: No headaches, memory loss, loss of strength, numbness, or tremors  SKIN: No itching, burning, rashes, or lesions   LYMPH NODES: No enlarged glands  ENDOCRINE: No heat or cold intolerance; No hair loss  MUSCULOSKELETAL: No joint pain or swelling; No muscle, back, or extremity pain  PSYCHIATRIC: No depression, anxiety, mood swings, or difficulty sleeping  HEME/LYMPH: No easy bruising, or bleeding gums  ALLERY AND IMMUNOLOGIC: No hives or eczema    Allergies    No Known Allergies    Intolerances        Social History:     FAMILY HISTORY:  FH: esophageal cancer (Mother)        MEDICATIONS  (STANDING):  indomethacin 100 milliGRAM(s) Oral once  lactated ringers. 1000 milliLiter(s) (125 mL/Hr) IV Continuous <Continuous>    MEDICATIONS  (PRN):  ondansetron Injectable 4 milliGRAM(s) IV Push every 8 hours PRN Nausea and/or Vomiting        CAPILLARY BLOOD GLUCOSE        I&O's Summary      PHYSICAL EXAM:  Vital Signs Last 24 Hrs  T(C): 36.3 (24 Aug 2021 08:34), Max: 36.7 (23 Aug 2021 17:45)  T(F): 97.3 (24 Aug 2021 08:34), Max: 98.1 (23 Aug 2021 17:45)  HR: 63 (24 Aug 2021 08:34) (60 - 79)  BP: 94/40 (24 Aug 2021 08:34) (94/40 - 113/68)  BP(mean): --  RR: 16 (24 Aug 2021 08:34) (16 - 18)  SpO2: 100% (24 Aug 2021 08:34) (97% - 100%)    GENERAL: NAD, well-developed  HEAD:  Atraumatic, Normocephalic  EYES: EOMI, PERRLA, conjunctiva and sclera clear  NECK: Supple, No JVD  CHEST/LUNG: Clear to auscultation bilaterally; No wheeze  HEART: Regular rate and rhythm; No murmurs, rubs, or gallops  ABDOMEN: Soft, Nontender, Nondistended; Bowel sounds present  EXTREMITIES:  2+ Peripheral Pulses, No clubbing, cyanosis, or edema  PSYCH: AAOx3  NEUROLOGY: non-focal  SKIN: No rashes or lesions    LABS:                        12.2   5.59  )-----------( 267      ( 23 Aug 2021 05:55 )             39.7     08    140  |  104  |  6<L>  ----------------------------<  87  3.9   |  25  |  1.06    Ca    9.4      23 Aug 2021 05:55    TPro  6.7  /  Alb  3.6  /  TBili  1.4<H>  /  DBili  0.6<H>  /  AST  134<H>  /  ALT  237<H>  /  AlkPhos  205<H>  08-23    PT/INR - ( 23 Aug 2021 05:55 )   PT: 13.3 sec;   INR: 1.11 ratio         PTT - ( 23 Aug 2021 05:55 )  PTT:32.6 sec      Urinalysis Basic - ( 22 Aug 2021 16:48 )    Color: Yellow / Appearance: Clear / S.007 / pH: x  Gluc: x / Ketone: Trace  / Bili: Negative / Urobili: Negative   Blood: x / Protein: Negative / Nitrite: Negative   Leuk Esterase: Negative / RBC: 1 /hpf / WBC 1 /HPF   Sq Epi: x / Non Sq Epi: 0 /hpf / Bacteria: Negative        RADIOLOGY & ADDITIONAL TESTS:    Imaging Personally Reviewed:    Consultant(s) Notes Reviewed:      Care Discussed with Consultants/Other Providers:  
Patient is a 51y old  Female who presents with a chief complaint of abdominal pain (23 Aug 2021 12:54)    2021  HPI:  ERCP done yesterday. Able to tolerate meals      PAST MEDICAL & SURGICAL HISTORY:  No pertinent past medical history    No pertinent past surgical history        Review of Systems:   CONSTITUTIONAL: No fever, weight loss, or fatigue  EYES: No eye pain, visual disturbances, or discharge  ENMT:  No difficulty hearing, tinnitus, vertigo; No sinus or throat pain  NECK: No pain or stiffness  BREASTS: No pain, masses, or nipple discharge  RESPIRATORY: No cough, wheezing, chills or hemoptysis; No shortness of breath  CARDIOVASCULAR: No chest pain, palpitations, dizziness, or leg swelling  GASTROINTESTINAL: No abdominal or epigastric pain. No nausea, vomiting, or hematemesis; No diarrhea or constipation. No melena or hematochezia.  GENITOURINARY: No dysuria, frequency, hematuria, or incontinence  NEUROLOGICAL: No headaches, memory loss, loss of strength, numbness, or tremors  SKIN: No itching, burning, rashes, or lesions   LYMPH NODES: No enlarged glands  ENDOCRINE: No heat or cold intolerance; No hair loss  MUSCULOSKELETAL: No joint pain or swelling; No muscle, back, or extremity pain  PSYCHIATRIC: No depression, anxiety, mood swings, or difficulty sleeping  HEME/LYMPH: No easy bruising, or bleeding gums  ALLERY AND IMMUNOLOGIC: No hives or eczema    Allergies    No Known Allergies    Intolerances        Social History:     FAMILY HISTORY:  FH: esophageal cancer (Mother)        MEDICATIONS  (STANDING):  indomethacin 100 milliGRAM(s) Oral once  lactated ringers. 1000 milliLiter(s) (125 mL/Hr) IV Continuous <Continuous>    MEDICATIONS  (PRN):  ondansetron Injectable 4 milliGRAM(s) IV Push every 8 hours PRN Nausea and/or Vomiting        CAPILLARY BLOOD GLUCOSE        I&O's Summary      PHYSICAL EXAM:  Vital Signs Last 24 Hrs  T(C): 36.3 (24 Aug 2021 08:34), Max: 36.7 (23 Aug 2021 17:45)  T(F): 97.3 (24 Aug 2021 08:34), Max: 98.1 (23 Aug 2021 17:45)  HR: 63 (24 Aug 2021 08:34) (60 - 79)  BP: 94/40 (24 Aug 2021 08:34) (94/40 - 113/68)  BP(mean): --  RR: 16 (24 Aug 2021 08:34) (16 - 18)  SpO2: 100% (24 Aug 2021 08:34) (97% - 100%)    GENERAL: NAD, well-developed  HEAD:  Atraumatic, Normocephalic  EYES: EOMI, PERRLA, conjunctiva and sclera clear  NECK: Supple, No JVD  CHEST/LUNG: Clear to auscultation bilaterally; No wheeze  HEART: Regular rate and rhythm; No murmurs, rubs, or gallops  ABDOMEN: Soft, Nontender, Nondistended; Bowel sounds present  EXTREMITIES:  2+ Peripheral Pulses, No clubbing, cyanosis, or edema  PSYCH: AAOx3  NEUROLOGY: non-focal  SKIN: No rashes or lesions    LABS:                        12.2   5.59  )-----------( 267      ( 23 Aug 2021 05:55 )             39.7     08    140  |  104  |  6<L>  ----------------------------<  87  3.9   |  25  |  1.06    Ca    9.4      23 Aug 2021 05:55    TPro  6.7  /  Alb  3.6  /  TBili  1.4<H>  /  DBili  0.6<H>  /  AST  134<H>  /  ALT  237<H>  /  AlkPhos  205<H>  0823    PT/INR - ( 23 Aug 2021 05:55 )   PT: 13.3 sec;   INR: 1.11 ratio         PTT - ( 23 Aug 2021 05:55 )  PTT:32.6 sec      Urinalysis Basic - ( 22 Aug 2021 16:48 )    Color: Yellow / Appearance: Clear / S.007 / pH: x  Gluc: x / Ketone: Trace  / Bili: Negative / Urobili: Negative   Blood: x / Protein: Negative / Nitrite: Negative   Leuk Esterase: Negative / RBC: 1 /hpf / WBC 1 /HPF   Sq Epi: x / Non Sq Epi: 0 /hpf / Bacteria: Negative        RADIOLOGY & ADDITIONAL TESTS:    Imaging Personally Reviewed:    Consultant(s) Notes Reviewed:      Care Discussed with Consultants/Other Providers:

## 2021-08-25 NOTE — DISCHARGE NOTE PROVIDER - CARE PROVIDERS DIRECT ADDRESSES
[email or web address]

## 2021-08-25 NOTE — DISCHARGE NOTE PROVIDER - NSDCDCMDCOMP_GEN_ALL_CORE
History  Chief Complaint   Patient presents with    Cough     Pt c/o cough and sore throat  Also with headache since yesterday  No fever  Had same symptoms last week  57-year-old female, prior history of smoking, presenting today with a cough that has worsened over the past 2 days  States that she was sick about a week and half ago however symptoms completely resolved  She does have a hoarse voice mildly sore throat  Relays that she does have history bronchitis and this feels very similar  Has not smoked in 5 years  Occasional shortness of breath with cough  Takes Nyquil with alleviated her symptoms  Denies fevers, nausea, vomiting, chest pain, abdominal pain, weakness, fatigue, diarrhea rash, facial swelling or difficulty swallowing, calf pain or swelling, hemoptysis  None       History reviewed  No pertinent past medical history  History reviewed  No pertinent surgical history  History reviewed  No pertinent family history  I have reviewed and agree with the history as documented  Social History     Tobacco Use    Smoking status: Former Smoker    Smokeless tobacco: Never Used   Substance Use Topics    Alcohol use: Yes     Comment: occasionally    Drug use: Never        Review of Systems   Constitutional: Negative  Negative for appetite change, chills and fever  HENT: Positive for sore throat  Negative for congestion, dental problem, ear pain, facial swelling, mouth sores, postnasal drip, sinus pressure and trouble swallowing  Eyes: Negative  Respiratory: Positive for cough and shortness of breath  Negative for apnea, choking, chest tightness, wheezing and stridor  Cardiovascular: Negative  Negative for chest pain  Gastrointestinal: Negative  Negative for abdominal distention, abdominal pain, blood in stool, constipation, diarrhea, nausea and vomiting  Genitourinary: Negative  Musculoskeletal: Negative    Negative for arthralgias, neck pain and neck stiffness  Skin: Negative  Negative for rash  Neurological: Negative  Negative for facial asymmetry and headaches  All other systems reviewed and are negative  Physical Exam  Physical Exam   Constitutional: She is oriented to person, place, and time  She appears well-developed and well-nourished  No distress  HENT:   Head: Normocephalic and atraumatic  Right Ear: External ear normal    Left Ear: External ear normal    Nose: Nose normal    Mouth/Throat: Oropharynx is clear and moist  No oropharyngeal exudate  Mild erythema noted  No swelling, exudate, or uvula deviation noted of mouth  No discoloration, asymmetries or swelling of the face  No ulcerations, fluctuance, induration or drainage noted  No petechiae noted on palate  Able to swallow without difficulty  Full ROM of jaw  Eyes: Pupils are equal, round, and reactive to light  Conjunctivae are normal  Right eye exhibits no discharge  Left eye exhibits no discharge  No scleral icterus  Neck: Normal range of motion  Neck supple  No tracheal deviation present  Cardiovascular: Normal rate, regular rhythm, normal heart sounds and intact distal pulses  Exam reveals no friction rub  No murmur heard  Pulmonary/Chest: Effort normal and breath sounds normal  No stridor  No respiratory distress  She has no wheezes  She has no rales  She exhibits no tenderness  SpO2 is 98%, within normal limits, resting comfortably  No cyanosis or pallor  Abdominal: Soft  Bowel sounds are normal  She exhibits no distension  There is no tenderness  There is no rebound and no guarding  Lymphadenopathy:     She has no cervical adenopathy  Neurological: She is alert and oriented to person, place, and time  Skin: Skin is warm and dry  Capillary refill takes less than 2 seconds  No rash noted  She is not diaphoretic  No erythema  No pallor  No sandpaper rash noted  No other rashes noted  Good coloration of skin      Nursing note and vitals reviewed  Vital Signs  ED Triage Vitals [01/08/20 0909]   Temperature Pulse Respirations Blood Pressure SpO2   97 7 °F (36 5 °C) 89 18 145/88 98 %      Temp src Heart Rate Source Patient Position - Orthostatic VS BP Location FiO2 (%)   -- -- -- -- --      Pain Score       6           Vitals:    01/08/20 0909   BP: 145/88   Pulse: 89         Visual Acuity      ED Medications  Medications - No data to display    Diagnostic Studies  Results Reviewed     None                 No orders to display              Procedures  Procedures         ED Course                               MDM  Number of Diagnoses or Management Options  Diagnosis management comments: Likely viral URI  Will treat symptomatically  Patient is informed to return to the emergency department for worsening of symptoms and was given proper education regarding their diagnosis and symptoms  Otherwise the patient is informed to follow up with their primary care doctor for re-evaluation  The patient verbalizes understanding and agrees with above assessment and plan  All questions were answered  Please Note: [employer] voice recognition software may have been used in the creation of this document  Wrong words or sound a like substitutions may have occurred due to the inherent limitations of the voice software  Amount and/or Complexity of Data Reviewed  Review and summarize past medical records: yes  Independent visualization of images, tracings, or specimens: yes          Disposition  Final diagnoses:   Viral URI with cough     Time reflects when diagnosis was documented in both MDM as applicable and the Disposition within this note     Time User Action Codes Description Comment    1/8/2020  9:21 AM Gopal Miller Add [J06 [id number],  B97 [id number]] Viral URI with cough       ED Disposition     ED Disposition Condition Date/Time Comment    Discharge Stable Wed Jan 8, 2020  9:21 AM Junior Marroquin discharge to home/self care  Follow-up Information     Follow up With Specialties Details Why Contact Info Additional [phone] Emergency Department Emergency Medicine Go to  If symptoms worsen suggest fevers, difficulty breathing etc [address] [facility], [place], [place], [address]          Patient's Medications   Discharge Prescriptions    ALBUTEROL (PROVENTIL HFA,VENTOLIN HFA) 90 MCG/ACT INHALER    Inhale 2 puffs every 6 (six) hours as needed for wheezing or shortness of breath       Start Date: 1/8/2020  End Date: 2/7/2020       Order Dose: 2 puffs       Quantity: 1 Inhaler    Refills: 0    BENZONATATE (TESSALON PERLES) 100 MG CAPSULE    Take 1 capsule (100 mg total) by mouth 3 (three) times a day as needed for cough       Start Date: 1/8/2020  End Date: --       Order Dose: 100 mg       Quantity: 20 capsule    Refills: 0     No discharge procedures on file      ED Provider  Electronically Signed by           Christa Carranza PA-C  01/08/20 9066 This document is complete and the patient is ready for discharge.

## 2021-08-25 NOTE — CONSULT NOTE ADULT - SUBJECTIVE AND OBJECTIVE BOX
Chief Complaint:  Patient is a 51y old  Female who presents with a chief complaint of abdominal pain (22 Aug 2021 21:11)    HPI:TRISTIAN MCLAUGHLIN is a 51y Female p/w abd pain x 2-3 days. She denies prior PMHx. Pain began as post-prandial epigastric squeezing radiating ot her back, a/w nausea but no vomiting. She denies diarrhea, f/c. No prior hx of similar symptoms. No personal or FHx of gallstones.     Denies prior surgery.    GI ROS negative for weight loss, f/c, dysphagia, odynophagia, early satiety, poor PO intake, vomiting, diarrhea, melena, hematochezia, change in stool caliber. No NSAID use.    PMHX/PSHX:  No pertinent past medical history    No pertinent past surgical history    Allergies:  No Known Allergies    Home Medications: reviewed  Hospital Medications:  lactated ringers. 1000 milliLiter(s) IV Continuous <Continuous>  ondansetron Injectable 4 milliGRAM(s) IV Push every 8 hours PRN    Social History:   Tob: Denies  EtOH: Denies  Illicit Drugs: Denies    Family history:  FH: esophageal cancer (Mother)    ROS:   General:  No  fevers, chills, night sweats, fatigue  Eyes:  Good vision, no reported pain  ENT:  No sore throat, pain, runny nose  CV:  No pain, palpitations  Pulm:  No dyspnea, cough  GI:  See HPI, otherwise negative  :  No  incontinence, nocturia  Muscle:  No pain, weakness  Neuro:  No memory problems  Psych:  No insomnia, mood problems, depression  Endocrine:  No polyuria, polydipsia, cold/heat intolerance  Heme:  No petechiae, ecchymosis, easy bruisability  Skin:  No rash    PHYSICAL EXAM:   Vital Signs:  Vital Signs Last 24 Hrs  T(C): 36.4 (23 Aug 2021 11:24), Max: 36.9 (22 Aug 2021 16:30)  T(F): 97.6 (23 Aug 2021 11:24), Max: 98.5 (22 Aug 2021 16:30)  HR: 67 (23 Aug 2021 11:24) (56 - 71)  BP: 113/68 (23 Aug 2021 11:24) (98/62 - 113/68)  BP(mean): --  RR: 18 (23 Aug 2021 11:24) (18 - 18)  SpO2: 98% (23 Aug 2021 11:24) (97% - 100%)  Daily Height in cm: 152.4 (23 Aug 2021 11:24)    Daily     GENERAL: no acute distress  NEURO: alert  HEENT: anicteric sclera, no conjunctival pallor appreciated  CHEST: no respiratory distress, no accessory muscle use  CARDIAC: regular rate, rhythm  ABDOMEN: soft, TTP RUQ and worst epigastrium. non-distended, no rebound or guarding  EXTREMITIES: warm, well perfused, no edema  SKIN: no lesions noted    LABS: reviewed                        12.2   5.59  )-----------( 267      ( 23 Aug 2021 05:55 )             39.7     08-23    140  |  104  |  6<L>  ----------------------------<  87  3.9   |  25  |  1.06    Ca    9.4      23 Aug 2021 05:55    TPro  6.7  /  Alb  3.6  /  TBili  1.4<H>  /  DBili  0.6<H>  /  AST  134<H>  /  ALT  237<H>  /  AlkPhos  205<H>  08-23    LIVER FUNCTIONS - ( 23 Aug 2021 05:55 )  Alb: 3.6 g/dL / Pro: 6.7 g/dL / ALK PHOS: 205 U/L / ALT: 237 U/L / AST: 134 U/L / GGT: x               Diagnostic Studies: see sunrise for full report        
CC: 51y old Female admitted with a chief complaint of abdominal pain, now found to have choledocholithiases     HPI:  51 yr old female w/ no significant PMH presents w/ abdominal pain. Patient notes that about one week ago she started experiencing epigastric pain that would start about 1-2 hours after eating. Reports pain started 3 days prior to presenting to hospital. Due to persistent pain finally came to ED. Denies any alleviating factors. Pain associated with nausea no episodes of emesis. In ED workup revealed elevated LFTs and T. bili. CT and US without evidence of gallstones. However, now s/p ERCP and sphincterotomy and stone removal. Surgery consulted for cholecystectomy. Denies fevers, chills, chest pain, SOB, diarrhea, constipation, dysuria, recent trauma or travel.     PMHx: No pertinent past medical history      PSHx: No pertinent past surgical history      Medications (inpatient): indomethacin 100 milliGRAM(s) Oral once  lactated ringers. 1000 milliLiter(s) IV Continuous <Continuous>    Medications (PRN):ondansetron Injectable 4 milliGRAM(s) IV Push every 8 hours PRN    Allergies: No Known Allergies  (Intolerances: )  Social Hx:   Family Hx: FH: esophageal cancer (Mother)        Physical Exam  T(C): 36.6  HR: 83 (66 - 83)  BP: 97/58 (96/61 - 99/59)  RR: 18 (16 - 18)  SpO2: 98% (98% - 99%)  Tmax: T(C): , Max: 36.7 (08-24-21 @ 15:30)    08-24-21  -  08-25-21  --------------------------------------------------------  IN:    Lactated Ringers: 5350 mL  Total IN: 5350 mL    OUT:    Oral Fluid: 0 mL    Voided (mL): 1200 mL  Total OUT: 1200 mL    Total NET: 4150 mL      08-25-21  -  08-25-21  --------------------------------------------------------  IN:    Oral Fluid: 540 mL  Total IN: 540 mL    OUT:  Total OUT: 0 mL    Total NET: 540 mL        General: well developed, well nourished, NAD  Neuro: alert and oriented, no focal deficits, moves all extremities spontaneously  HEENT: NCAT, EOMI, anicteric, mucosa moist  Respiratory: airway patent, respirations unlabored  CVS: regular rate and rhythm  Abdomen: soft, nontender, nondistended  Extremities: no edema, sensation and movement grossly intact  Skin: warm, dry, appropriate color    Labs:      08-25    140  |  106  |  7   ----------------------------<  104<H>  4.0   |  23  |  0.87    Ca    8.7      25 Aug 2021 07:19    TPro  5.9<L>  /  Alb  3.1<L>  /  TBili  0.8  /  DBili  x   /  AST  56<H>  /  ALT  118<H>  /  AlkPhos  149<H>  08-25            Imaging and other studies:  < from: ERCP (08.24.21 @ 07:57) >                                                                     Impression:          - Left sided Zencker's diverticulum                       - Choledocholithasis s/p sphincterotomy, balloon sweep with no remaining                        stones  Recommendation:      - Return patient to hospital owen for ongoing care.                       - 3L LR in the PACU                       - Clear liquid diet today as tolerated, advance tomorrow                       - Trend liver tests        - Surgical consult for cholecystectomy evaluation    < from: US Abdomen Upper Quadrant Right (08.22.21 @ 18:10) >    FINDINGS:    Liver: Hepatic lobe measures 13.2 cm. Echogenic liver lesion measuring 1.2 x 0.7 x 0.9 cm, likely representing hemangioma.  Bile ducts: Borderline common duct, 7 mm. Mild intrahepatic biliary duct dilatation  Gallbladder: No gallstones.  Pancreas: Visualized portions are within normal limits.  Right kidney: 9.6 cm. No hydronephrosis.  Ascites: None.  IVC: Visualized portions are within normal limits.    IMPRESSION:    No gallstones identified.  Borderline common duct and mild intrahepatic biliary duct dilatation. Recommend MRCP in light of elevated LFTs.  Echogenic hepatic focus, likely representing hemangioma

## 2021-08-25 NOTE — DISCHARGE NOTE NURSING/CASE MANAGEMENT/SOCIAL WORK - NSDCPEFALRISK_GEN_ALL_CORE
For information on Fall & injury Prevention, visit [email or web address]

## 2021-08-25 NOTE — DISCHARGE NOTE PROVIDER - NSDCMRMEDTOKEN_GEN_ALL_CORE_FT
Gas-X: as needed  indomethacin 25 mg oral capsule: 4 cap(s) orally once  Multiple Vitamins oral tablet: 1 tab(s) orally once a day  omeprazole 20 mg oral delayed release tablet: 1 tab(s) orally once a day

## 2021-08-25 NOTE — DISCHARGE NOTE NURSING/CASE MANAGEMENT/SOCIAL WORK - PATIENT PORTAL LINK FT
You can access the FollowMyHealth Patient Portal offered by [facility] by registering at the following website: [email or web address]. By joining [facility]’s FollowMyHealth portal, you will also be able to view your health information using other applications (apps) compatible with our system.

## 2021-08-25 NOTE — DISCHARGE NOTE PROVIDER - HOSPITAL COURSE
51F p/w 2-3 days of post-prandial abd pain, found to have elevated liver enzymes and imaging showing stone at ampulla. S/p ERCP 8/24 w/ sphincterotomy and sludge/stone removal, post-procedure cholangiogram w/o residual stones.   #Choledocholithiasis w/o cholangitis - s/p ERCP  - Advance diet to solids. If pt tolerating can be discharged home today from GI perspective.  - seen by surgery ---outpt follow-up. Pt tolerated sold diet. No n/v      Patient is medically cleared for discharge home by Dr. Cates

## 2021-08-25 NOTE — CONSULT NOTE ADULT - ASSESSMENT
50y/o no PMhx admitted for choledocholithiases surgery consulted to plan for elective cholecystectomy     - pt. would like to wait for surgery as she has travel plans   -she understand potential chance of another incident of choledocholithiases, acute cholecystitis or gallstone pancreatitis   - trend LFTs   - pt, can f/u outpatient with Dr. Wu   Office Contact  [phone]    Discussed with attending Dr. Wu   [phone]

## 2021-08-25 NOTE — PROGRESS NOTE ADULT - PROBLEM SELECTOR PLAN 2
- w/ elevated bilirubin; likely secondary to stone, as above  S/p ERCP
- w/ elevated bilirubin; likely secondary to stone, as above  - F/up CMP, T bili, PT/INR
- w/ elevated bilirubin; likely secondary to stone, as above  S/p ERCP

## 2021-08-25 NOTE — PROGRESS NOTE ADULT - PROBLEM SELECTOR PLAN 1
Choledocholithiasis seen on CT. MRCP ordered. Advanced GI to evaluate for ERCP
S/p ERCP  DC planning  Abnormal LFTs related to choledocholithiasis, this will need to be monitored as out patient
Noted to have Choledocholithiasis ERCP done

## 2021-08-25 NOTE — DISCHARGE NOTE PROVIDER - PROVIDER TOKENS
PROVIDER:[TOKEN:[[id number]]],PROVIDER:[TOKEN:[[id number]]],PROVIDER:[TOKEN:[[id number]]]

## 2021-08-25 NOTE — PROGRESS NOTE ADULT - ATTENDING COMMENTS
As above.    Choledocholithiasis found on EUS, s/p clearance by ERCP  Abnormal LFTs, improving    Recommendations:    Follow LFTs as outpatient to ensure normalization.  Surgery followup for cholecystectomy

## 2021-08-25 NOTE — DISCHARGE NOTE PROVIDER - NSDCCPCAREPLAN_GEN_ALL_CORE_FT
PRINCIPAL DISCHARGE DIAGNOSIS  Diagnosis: Undifferentiated abdominal pain  Assessment and Plan of Treatment: -Resolved  -- pt, can f/u outpatient with  Dr. Wu - Surgeon     Office Contact  [phone]  --Follow-up GI - Dr. IGLESIAS --call for appointment   - elevated liver enzymes and imaging showing stone at ampulla. S/p ERCP 8/24 w/ sphincterotomy and sludge/stone removal, post-procedure cholangiogram w/o residual stones.   -Follow-up with Dr. Cates next week.

## 2021-08-25 NOTE — PROGRESS NOTE ADULT - ASSESSMENT
51F p/w 2-3 days of post-prandial abd pain, found to have elevated liver enzymes and imaging showing stone at ampulla. S/p ERCP 8/24 w/ sphincterotomy and sludge/stone removal, post-procedure cholangiogram w/o residual stones.     Impression:  #Choledocholithiasis w/o cholangitis - s/p ERCP    Recommendations:  - Advance diet to solids. If pt tolerating can be discharged home today from GI perspective.  - Pt should see surgery for discussion of cholecystectomy.  - F/u w/ surgery.  - Please call back w/ questions.    Bronson Nieves  Gastroenterology/Hepatology Fellow  Available via [employer] Teams    NON-URGENT CONSULTS:  Please email [email or web address]  AT NIGHT AND ON WEEKENDS:  Contact on-call GI fellow via answering service ([phone]) from 5pm-8am and on weekends/holidays  MONDAY-FRIDAY 8AM-5PM:  Pager# [phone] ([facility]) or [phone] ([facility])  GI Phone# [phone] ([facility])  
51 yr old female presents w/ abdominal pain. Noted to have elevated LFTs w/ cholestatic pattern. CT w/ dilated CBD and minimal intrahepatic biliary ductal dilation.

## 2021-08-25 NOTE — DISCHARGE NOTE PROVIDER - CARE PROVIDER_API CALL
Jak Wu)  Surgery; Surgical Critical Care  [address] [place] [address], [address]  [place] [address]  Phone: [phone]  Fax: [phone]  Follow Up Time:     Chacho Liao)  Gastroenterology; Internal Medicine  Division of Gastroenterology - [address] [place] [address]  Phone: [phone]  Fax: [phone]  Follow Up Time:     Bianka Cates)  Internal Medicine  [address] [place] [address]  Phone: [phone]  Fax: [phone]  Follow Up Time:

## 2022-04-04 NOTE — ED ADULT NURSE NOTE - OBJECTIVE STATEMENT
50 yo F with pmhx of   presents with epigastric pain x 1 week. Pt was evaluated by her PMD yesterday and prescribed Omeprazole and told to follow up with GI, but pt reports increasing pain which radiates to her back. Pt is afebrile, VSS on arrival. Appears to be in NAD in exam room. Abd soft, nt/nd. Lungs CTA. denies chest pain, shortness of breath, headache, nausea, vomiting, diarrhea, fevers, chills, urinary symptoms, hematuria, bloody stool, falls, traumas. patient refused

## 2022-12-29 NOTE — ED PROVIDER NOTE - PHYSICAL EXAMINATION
4 = No assist / stand by assistance GENERAL: no acute distress, non-toxic appearing  HEAD: normocephalic, atraumatic  HEENT: normal conjunctiva, oral mucosa moist, neck supple  CARDIAC: regular rate and rhythm, normal S1 and S2,  no appreciable murmurs  PULM: clear to ascultation bilaterally, no crackles, rales, rhonchi, or wheezing  GI: +epigastric tenderness, abdomen nondistended, soft, no guarding or rebound tenderness  : no CVA tenderness, no suprapubic tenderness  NEURO: alert and oriented x 3, normal speech, EOMI, no focal motor or sensory deficits, nonantalgic gait  MSK: no visible deformities, no peripheral edema, calf tenderness/redness/swelling  SKIN: no visible rashes, dry, well-perfused  PSYCH: appropriate mood and affect

## 2023-06-13 NOTE — PRE-ANESTHESIA EVALUATION ADULT - NSANTHSUBSTSD_GEN_ALL_CORE
Transitional Care Management Telephone Call    Today's Date: 6/13/2023      Discharge Date: 6/10/23    Discharged From: [facility] ED    Discharge Diagnosis: Cervical radiculopathy at C6    Items for attention: Spoke to daughter in law who gave me patient's home number. Patient states her neck pain is significantly reduced, with no arm or jaw pain. Patient denies dizziness or numbness and tingling.      Care Transitions Assessment    Utilization:  Visit Hospital Last 30 Days: ED visit   Perception of Change in Health Status D/C: Improving  Discharged To: Home independently  Discharge Instructions: Received discharge instructions; Understands d/c instructions      Medications:  Prescriptions: st prednisone  IP/Amb Med List Reviewed with Patient: Yes      Follow-up Care:  Appointments:    Provider Appt Date: 08/17/23   Type of Provider: PCP      Review of Systems:   Care Transition System Evaluation:    Pain:   not rated  Pain Location: neck pain reduced  General Symptoms: Fatigue  Neurologic/Neuromuscular Symptoms: WDL (absence of symptoms)  ENT Symptoms: WDL (absence of symptoms)  Respiratory Symptoms: WDL (absence of symptoms)  Cardiovascular Symptoms: WDL (absence of symptoms)  GI Symptoms: WDL (absence of symptoms)   Symptoms: WDL (absence of symptoms)  Activities of Daily Living (global): Self-care  Family Support/Assistance: Son and daughter in law     Patient verbalizes understanding of importance of keeping follow up appointments, AVS information and when to seek emergency care for new or worsening symptoms.     Next Care Transitions follow-up call will be within 1 week.     Plan for next follow-up call: Routine Care Transitions assessment, needs identification and ongoing support.    
No

## 2024-05-30 PROBLEM — Z78.9 OTHER SPECIFIED HEALTH STATUS: Chronic | Status: ACTIVE | Noted: 2021-08-22

## 2024-06-10 ENCOUNTER — OUTPATIENT (OUTPATIENT)
Dept: OUTPATIENT SERVICES | Facility: HOSPITAL | Age: 54
LOS: 1 days | End: 2024-06-10
Payer: COMMERCIAL

## 2024-06-10 ENCOUNTER — APPOINTMENT (OUTPATIENT)
Dept: RADIOLOGY | Facility: IMAGING CENTER | Age: 54
End: 2024-06-10
Payer: COMMERCIAL

## 2024-06-10 DIAGNOSIS — Z78.9 OTHER SPECIFIED HEALTH STATUS: Chronic | ICD-10-CM

## 2024-06-10 DIAGNOSIS — R53.83 OTHER FATIGUE: ICD-10-CM

## 2024-06-10 PROCEDURE — 77080 DXA BONE DENSITY AXIAL: CPT

## 2024-06-10 PROCEDURE — 77085 DXA BONE DENSITY AXL VRT FX: CPT | Mod: 26

## 2024-06-10 PROCEDURE — 77085 DXA BONE DENSITY AXL VRT FX: CPT
